# Patient Record
Sex: MALE | Race: WHITE | NOT HISPANIC OR LATINO | Employment: OTHER | URBAN - METROPOLITAN AREA
[De-identification: names, ages, dates, MRNs, and addresses within clinical notes are randomized per-mention and may not be internally consistent; named-entity substitution may affect disease eponyms.]

---

## 2023-10-25 ENCOUNTER — TELEPHONE (OUTPATIENT)
Dept: ORTHOPEDIC SURGERY | Facility: HOSPITAL | Age: 58
End: 2023-10-25
Payer: MEDICAID

## 2023-10-25 NOTE — TELEPHONE ENCOUNTER
Left voicemail informing patient of need to move appointment to later time in the morning due to urgent surgery add-on.

## 2023-10-26 NOTE — TELEPHONE ENCOUNTER
Patient returned my call. Offered later times in the morning to be seen, these did not work for him. He opted to r/s to different day, settled on 11/10 @ 8:40am. He thanked me for reaching out and there were no further needs.

## 2023-10-27 ENCOUNTER — APPOINTMENT (OUTPATIENT)
Dept: ORTHOPEDIC SURGERY | Facility: HOSPITAL | Age: 58
End: 2023-10-27
Payer: COMMERCIAL

## 2023-11-10 ENCOUNTER — APPOINTMENT (OUTPATIENT)
Dept: ORTHOPEDIC SURGERY | Facility: HOSPITAL | Age: 58
End: 2023-11-10
Payer: COMMERCIAL

## 2023-11-11 PROBLEM — K64.4 INTERNAL AND EXTERNAL HEMORRHOIDS WITHOUT COMPLICATION: Status: ACTIVE | Noted: 2023-11-11

## 2023-11-11 PROBLEM — K40.90 INGUINAL HERNIA: Status: ACTIVE | Noted: 2023-11-11

## 2023-11-11 PROBLEM — K62.5 RECTAL BLEEDING: Status: ACTIVE | Noted: 2023-11-11

## 2023-11-11 PROBLEM — K64.8 INTERNAL AND EXTERNAL HEMORRHOIDS WITHOUT COMPLICATION: Status: ACTIVE | Noted: 2023-11-11

## 2023-11-11 PROBLEM — G24.3 SPASMODIC TORTICOLLIS: Status: ACTIVE | Noted: 2023-11-11

## 2023-11-11 RX ORDER — VALACYCLOVIR HYDROCHLORIDE 500 MG/1
TABLET, FILM COATED ORAL
COMMUNITY
End: 2023-11-29 | Stop reason: ALTCHOICE

## 2023-11-11 RX ORDER — SODIUM, POTASSIUM,MAG SULFATES 17.5-3.13G
SOLUTION, RECONSTITUTED, ORAL ORAL
COMMUNITY
End: 2023-11-29 | Stop reason: ALTCHOICE

## 2023-11-11 RX ORDER — IBUPROFEN 800 MG/1
TABLET ORAL
COMMUNITY
Start: 2023-04-06 | End: 2023-11-29 | Stop reason: ALTCHOICE

## 2023-11-11 RX ORDER — MELOXICAM 15 MG/1
15 TABLET ORAL DAILY
COMMUNITY
Start: 2023-10-03

## 2023-11-11 RX ORDER — LANOLIN ALCOHOL/MO/W.PET/CERES
100 CREAM (GRAM) TOPICAL
COMMUNITY

## 2023-11-13 ENCOUNTER — OFFICE VISIT (OUTPATIENT)
Dept: ORTHOPEDIC SURGERY | Facility: CLINIC | Age: 58
End: 2023-11-13
Payer: COMMERCIAL

## 2023-11-13 DIAGNOSIS — M25.521 RIGHT ELBOW PAIN: Primary | ICD-10-CM

## 2023-11-13 PROCEDURE — 99204 OFFICE O/P NEW MOD 45 MIN: CPT | Performed by: STUDENT IN AN ORGANIZED HEALTH CARE EDUCATION/TRAINING PROGRAM

## 2023-11-13 ASSESSMENT — PAIN DESCRIPTION - DESCRIPTORS: DESCRIPTORS: SORE

## 2023-11-13 ASSESSMENT — PAIN SCALES - GENERAL: PAINLEVEL_OUTOF10: 0 - NO PAIN

## 2023-11-13 ASSESSMENT — PAIN - FUNCTIONAL ASSESSMENT: PAIN_FUNCTIONAL_ASSESSMENT: 0-10

## 2023-11-13 NOTE — PROGRESS NOTES
History of Present Illness   Patient presents today for evaluation of right upper extremity pain.    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  He states he continued to have pain and has not felt much improvement.  His pain is continued to worsen. his pain to the lateral side of his elbow into his forearm.  He states he has pain with forearm rotation. He has pain with griping.  The patient denies any current numbness or tingling.  The pain is better with rest worse with motion.    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     Past Medical History:   Diagnosis Date    Herpesviral infection, unspecified     Herpes simplex type 1 infection       Medication Documentation Review Audit       Reviewed by Lynn Tesfaye MA (Medical Assistant) on 11/13/23 at 0849      Medication Order Taking? Sig Documenting Provider Last Dose Status   ibuprofen 800 mg tablet [id number]  TAKE 1 TABLET BY MOUTH EVERY 6 HOURS WITH FOOD Historical Provider, MD  Active   meloxicam (Mobic) 15 mg tablet [id number]  Take 1 tablet (15 mg) by mouth once daily. Historical Provider, MD  Active   sodium,potassium,mag sulfates (Suprep Bowel Prep Kit) 17.5-3.13-1.6 gram recon soln solution [id number]  USE AS DIRECTED. Historical Provider, MD  Active   thiamine (Vitamin B-1) 100 mg tablet [id number]  Take 1 tablet (100 mg) by mouth. Historical Provider, MD  Active   valACYclovir (Valtrex) 500 mg tablet [id number]  Valtrex 500 MG Oral Tablet   Quantity: 30  Refills: 0        Start : 12-Feb-2015   Active Historical Provider, MD  Active                    Allergies   Allergen Reactions    Escitalopram Oxalate Other     hallucinations       Social History     Socioeconomic History    Marital status:      Spouse name: Not on file    Number of children: Not on file    Years of education: Not on file    Highest education level: Not on file   Occupational History    Not on file   Tobacco  Use    Smoking status: Not on file    Smokeless tobacco: Not on file   Substance and Sexual Activity    Alcohol use: Not on file    Drug use: Not on file    Sexual activity: Not on file   Other Topics Concern    Not on file   Social History Narrative    Not on file     Social Determinants of Health     Financial Resource Strain: Not on file   Food Insecurity: Not on file   Transportation Needs: Not on file   Physical Activity: Not on file   Stress: Not on file   Social Connections: Not on file   Intimate Partner Violence: Not on file   Housing Stability: Not on file       Past Surgical History:   Procedure Laterality Date    TONSILLECTOMY  12/23/2013    Tonsillectomy          Review of Systems   GENERAL: Negative  GI: Negative  MUSCULOSKELETAL: See HPI  SKIN: Negative  NEURO:  Negative     Physical Exam:  side: right upper extremity:  Skin healthy to gross inspection, no breakdown  Posterior lateral scar over the lateral side of the elbow.  There is no erythema or warmth.  There is pain to palpation over the radiocapitellar joint.  There is also pain over the dorsal forearm.  This is over the extensor compartments and radial tunnel.  Nontender over the medial or lateral epicondyle.  There is pain with passive and active pronosupination.  There is pain with resisted wrist extension.  There is no pain with resisted finger extension.  He can make a composite fist to his distal palmar crease.  He does have pain to lateral sided elbow when he makes a tight fist.  Intact flexion and extension of 1st IP joint and finger abduction  Sensation intact to light touch medial / ulnar and radial nerve distribution   Good cap refill     Imaging  From outside institution were reviewed in office by me.  These consisted of preoperative x-rays of his right elbow and include fluoroscopic images dated 3/24/2022 and 2 sets of postoperative films.  His most recent postoperative films were dated 8/18/2022  Previous films demonstrated mild  to moderate elbow arthritis.  This is most severe at the radiocapitellar joint with osteophytes off the radial neck.  And mild cystic changes to the capitellum.  There is also ulnar humeral osteophyte medially.  Joint spaces are well-maintained.    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  Patient continues have lateral sided elbow pain and forearm pain.  This is worse with activities.  Images demonstrate significant capitellar erosion.  I believe most of his pain is coming from his capitellar erosion.  The patient is hesitant to proceed with any additional procedures as he did not have an optimal outcome from his radial head replacement.  We discussed a diagnostic injection to the radial capitellar joint of local anesthetic to see if it alleviates his pain.  He was agreeable to our plan.  I will refer him to Pepper Sanders MD for diagnostic radiocapitellar injection under ultrasound guidance.  He will follow-up afterwards.    Patient was discussed with Pepper Sanders MD. she was agreeable to our plan and she will see him in office in the next 1 to 2 weeks to perform a diagnostic injection.     Follow-up  after ultrasound guidance radiocapitellar injection with local anesthetic

## 2023-11-21 ENCOUNTER — APPOINTMENT (OUTPATIENT)
Dept: ORTHOPEDIC SURGERY | Facility: HOSPITAL | Age: 58
End: 2023-11-21
Payer: COMMERCIAL

## 2023-11-27 ENCOUNTER — HOSPITAL ENCOUNTER (OUTPATIENT)
Dept: RADIOLOGY | Facility: EXTERNAL LOCATION | Age: 58
Discharge: HOME | End: 2023-11-27

## 2023-11-29 ENCOUNTER — OFFICE VISIT (OUTPATIENT)
Dept: ORTHOPEDIC SURGERY | Facility: HOSPITAL | Age: 58
End: 2023-11-29
Payer: COMMERCIAL

## 2023-11-29 DIAGNOSIS — M19.021 ARTHRITIS OF ELBOW, RIGHT: ICD-10-CM

## 2023-11-29 DIAGNOSIS — M25.521 RIGHT ELBOW PAIN: Primary | ICD-10-CM

## 2023-11-29 PROCEDURE — 2500000005 HC RX 250 GENERAL PHARMACY W/O HCPCS: Performed by: STUDENT IN AN ORGANIZED HEALTH CARE EDUCATION/TRAINING PROGRAM

## 2023-11-29 PROCEDURE — 99214 OFFICE O/P EST MOD 30 MIN: CPT | Mod: GC | Performed by: STUDENT IN AN ORGANIZED HEALTH CARE EDUCATION/TRAINING PROGRAM

## 2023-11-29 PROCEDURE — 99204 OFFICE O/P NEW MOD 45 MIN: CPT | Performed by: STUDENT IN AN ORGANIZED HEALTH CARE EDUCATION/TRAINING PROGRAM

## 2023-11-29 PROCEDURE — 20606 DRAIN/INJ JOINT/BURSA W/US: CPT | Performed by: STUDENT IN AN ORGANIZED HEALTH CARE EDUCATION/TRAINING PROGRAM

## 2023-11-29 RX ORDER — LIDOCAINE HYDROCHLORIDE 10 MG/ML
3 INJECTION, SOLUTION EPIDURAL; INFILTRATION; INTRACAUDAL; PERINEURAL
Status: COMPLETED | OUTPATIENT
Start: 2023-11-29 | End: 2023-11-29

## 2023-11-29 RX ADMIN — LIDOCAINE HYDROCHLORIDE 3 ML: 10 INJECTION, SOLUTION EPIDURAL; INFILTRATION; INTRACAUDAL; PERINEURAL at 08:31

## 2023-11-29 ASSESSMENT — PAIN - FUNCTIONAL ASSESSMENT: PAIN_FUNCTIONAL_ASSESSMENT: 0-10

## 2023-11-29 ASSESSMENT — PAIN SCALES - GENERAL: PAINLEVEL_OUTOF10: 1

## 2023-11-29 NOTE — Clinical Note
He didn't get relief with the anesthetic injection. I wonder if his pain could be related to PIN neuropathy? EMG/NCS +/- diagnostic anesthetic injection around the nerve could be a good next step. I didn't know your follow-up pain, but I said you'd reach out to him for an appointment for follow-up or the plan.

## 2023-11-29 NOTE — PROGRESS NOTES
REFERRAL SOURCE: Dr. Carlos    CHIEF COMPLAINT: right elbow pain    HISTORY OF PRESENT ILLNESS  Willis Benjamin is a very pleasant 58 y.o. right hand dominant male with history of right radial head replacement (@[facility] on 3/11/22) who is here for evaluation of right elbow pain.     11/29/23: He underwent a radial head replacement at [facility] on 3/11/2022.  He did not notice much improvement post replacement and continues to have pain on the lateral side of his elbow into the forearm and pain with forearm rotation.  He also has pain with gripping.  It is localized over the extensor compartment musculature with some pain radiating to the palmar aspect of the radial wrist.  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  He was referred by Dr. Palacios for an anesthetic injection into the right elbow joint.    MEDS    Current Outpatient Medications:     ibuprofen 800 mg tablet, TAKE 1 TABLET BY MOUTH EVERY 6 HOURS WITH FOOD, Disp: , Rfl:     meloxicam (Mobic) 15 mg tablet, Take 1 tablet (15 mg) by mouth once daily., Disp: , Rfl:     sodium,potassium,mag sulfates (Suprep Bowel Prep Kit) 17.5-3.13-1.6 gram recon soln solution, USE AS DIRECTED., Disp: , Rfl:     thiamine (Vitamin B-1) 100 mg tablet, Take 1 tablet (100 mg) by mouth., Disp: , Rfl:     valACYclovir (Valtrex) 500 mg tablet, Valtrex 500 MG Oral Tablet  Quantity: 30  Refills: 0      Start : 12-Feb-2015  Active, Disp: , Rfl:     ALLERGIES  Allergies   Allergen Reactions    Escitalopram Oxalate Other     hallucinations       PAST MEDICAL HISTORY  Past Medical History:   Diagnosis Date    Herpesviral infection, unspecified     Herpes simplex type 1 infection       PAST SURGICAL HISTORY  Past Surgical History:   Procedure Laterality Date    TONSILLECTOMY  12/23/2013    Tonsillectomy       SOCIAL HISTORY   Social History     Socioeconomic History    Marital status:      Spouse name: Not on file    Number of children: Not on file    Years of education: Not on  file    Highest education level: Not on file   Occupational History    Not on file   Tobacco Use    Smoking status: Not on file    Smokeless tobacco: Not on file   Substance and Sexual Activity    Alcohol use: Not on file    Drug use: Not on file    Sexual activity: Not on file   Other Topics Concern    Not on file   Social History Narrative    Not on file     Social Determinants of Health     Financial Resource Strain: Not on file   Food Insecurity: Not on file   Transportation Needs: Not on file   Physical Activity: Not on file   Stress: Not on file   Social Connections: Not on file   Intimate Partner Violence: Not on file   Housing Stability: Not on file       FAMILY HISTORY  No family history on file.    REVIEW OF SYSTEMS  Except for those mentioned in the history of present illness, and below, a complete review of systems is negative.     Review of Systems    VITALS  There were no vitals filed for this visit.    PHYSICAL EXAMINATION   GENERAL:  Awake, alert, and oriented, no apparent distress, pleasant, and cooperative  PSYC: Mood is euthymic, affect is congruent  EAR, NOSE, THROAT:  Normocephalic, atraumatic, moist membranes, anicteric sclera  LUNG: Nonlabored breathing  HEART: No clubbing or cyanosis  SKIN: No increased erythema, warmth, rashes, or concerning skin lesions  NEURO: Sensation is intact in the bilateral upper extremities. Strength is grossly 5 out of 5 throughout the bilateral upper extremities, unless noted below.  MUSCULOSKELETAL: Examination of the right elbow: Range of motion is near full with flexion/extension and pronation/supination.  Tenderness palpation over the lateral elbow joint extending into the extensor compartment of the forearm.  Pain with resisted wrist extension and pronosupination.    IMAGING STUDIES:   Radiographs dated 8/18/2022 were personally reviewed and interpreted by me, Dr. Pepper Sanders, and the findings shared with the patient.  Well-seated proud radial head with  capitellar erosion.     IMPRESSION  #1  Right elbow pain s/p radial head replacement @ [facility] on 3/11/22    PLAN  The following was discussed with the patient:     Willis Benjamin is a very pleasant 58 y.o. male right hand dominant male with history of right radial head replacement (@[facility] on 3/11/22) who is here for evaluation of right elbow pain.  His elbow pain could be intra-articular versus extra-articular pain.   -He was referred for a diagnostic anesthetic intra-articular elbow injection which was performed today ultrasound guidance.  Please see procedure note section for complete details.  -The injection did not relieve any of his typical pain.  Given the distribution of his pain, it could be related to PIN neuropathy and pursuing EMG and possible PIN anesthetic injection/hydrodissection could be a reasonable next step.  -He can follow-up with me as needed and I will send this note to Dr. Carlos.    The patient was counseled to remain active, but avoid activities that worsen symptoms. The patient was in agreement with this plan. All questions were answered to the best of my ability.    PATIENT EDUCATION:  Education was discussed at today's appointment. A learning needs assessment was performed.    Primary learner: Willis Benjamin  Barriers to learning: None  Preferred language: English  Learning preferences include: Seeing and doing.  Discussed: Diagnosis and treatment plan.  Demonstrated: Understanding of material discussed.  Patient education materials given: None.  Learner response: Learner demonstrated understanding.    This note was dictated using Dragon speech recognition software and was not corrected for spelling or grammatical errors.    Med Jt Asp/Inj: R elbow on 11/29/2023 8:31 AM  Details: ultrasound-guided  Medications: 3 mL lidocaine PF 10 mg/mL (1 %)    Pre-Procedure Diagnosis: right elbow pain  Post-Procedure Diagnosis: right elbow pain    Procedure: US-guided right elbow joint anesthetic  injection    History of Present Illness: Willis Benjamin is a pleasant 58 y.o. male with right elbow pain secondary to right elbow arthritis who is here for the above procedure for diagnostic purposes.     Medications and allergies were reviewed with the patient. No contraindications were identified.    Informed Consent  Following denial of allergy and review of potential side effects and complications including but not necessarily limited to infection, allergic reaction, local tissue breakdown, systemic effects of corticosteroids, elevation of blood glucose, injury to soft tissue and/or nerves and seizure, the patient indicated understanding and agreed to proceed. Written consent to treatment was obtained and the patient verbalized consent for the procedure.    Procedural Details  The use of direct ultrasound visualization of the needle (rather than a non-guided injection) was required to increase patient safety by excluding inadvertent intramuscular or intratendinous placement and minimizing bleeding by avoiding osteochondral or vascular injury from the needle.  Additionally, the increased accuracy of placement may increase clinical effectiveness and will allow higher diagnostic specificity when evaluating effectiveness of this injection.    Using ultrasound, a pre-scan of the region was performed to identify the target structure.     The area was prepped with chlorhexidine, then re-examined using the same transducer, a sterile ultrasound transducer cover, and sterile ultrasound gel.    Procedural pause conducted to verify:  correct patient identity, procedure to be performed, and as applicable, correct side and site, correct patient position, and availability of implants, special equipment, or special requirements    Procedure:  Transducer: Linear array transducer.  Patient position: Supine with the elbow flexed to 45 degrees, forearm pronated and lying comfortably over the lower abdomen.   Localization process: The  radiocapitellar joint was localized in a short axis view.  Local anesthesia: No local anesthesia was used.  Needle: A 27-gauge, 1.5 inch needle was used for the injection.  Approach: A lateral to medial, out of plane, approach was used to guide the needle tip into the radiocapitellar joint.  Injection/Aspiration: A mixture of 3cc of 1% lidocaine was injected into the right elbow joint without complication.    Post-procedural care: The patient tolerated the procedure well and reported complete pain relief during the anesthetic phase. The patient was asked to ice for improved pain control and avoid submerging the area in water for the next 48 hours to help reduce the risk of infection. The patient was instructed to call the office immediately if there are any questions or concerns.     PATIENT EDUCATION:  Education of the diagnosis and treatment plan was discussed at today's appointment. A learning needs assessment was performed. The patient demonstrated understanding.          Patient seen and examined with sports medicine fellow, Dr. Hadley. History, physical examination, pertinent imaging findings and the plan of care were discussed and I performed the key portions of the history, physical examination, and discussion of the plan of care. I have edited his note and agree with the findings. Dr. Hadley performed the procedure under my direct supervision. I was present for the entire procedure.     Pepper Sanders MD    [facility] [place]   and [facility]